# Patient Record
Sex: FEMALE | Race: WHITE | URBAN - METROPOLITAN AREA
[De-identification: names, ages, dates, MRNs, and addresses within clinical notes are randomized per-mention and may not be internally consistent; named-entity substitution may affect disease eponyms.]

---

## 2021-09-02 ENCOUNTER — OFFICE VISIT (OUTPATIENT)
Dept: URBAN - METROPOLITAN AREA CLINIC 80 | Facility: CLINIC | Age: 36
End: 2021-09-02
Payer: COMMERCIAL

## 2021-09-02 ENCOUNTER — WEB ENCOUNTER (OUTPATIENT)
Dept: URBAN - METROPOLITAN AREA CLINIC 80 | Facility: CLINIC | Age: 36
End: 2021-09-02

## 2021-09-02 ENCOUNTER — LAB OUTSIDE AN ENCOUNTER (OUTPATIENT)
Dept: URBAN - METROPOLITAN AREA CLINIC 80 | Facility: CLINIC | Age: 36
End: 2021-09-02

## 2021-09-02 VITALS
SYSTOLIC BLOOD PRESSURE: 131 MMHG | HEART RATE: 73 BPM | BODY MASS INDEX: 25.74 KG/M2 | WEIGHT: 164 LBS | DIASTOLIC BLOOD PRESSURE: 89 MMHG | HEIGHT: 67 IN | TEMPERATURE: 97.9 F

## 2021-09-02 DIAGNOSIS — L88 PYODERMA GANGRENOSA: ICD-10-CM

## 2021-09-02 DIAGNOSIS — R19.7 DIARRHEA, UNSPECIFIED TYPE: ICD-10-CM

## 2021-09-02 DIAGNOSIS — K51.311 ULCERATIVE RECTOSIGMOIDITIS WITH RECTAL BLEEDING: ICD-10-CM

## 2021-09-02 DIAGNOSIS — K62.5 RECTAL BLEEDING: ICD-10-CM

## 2021-09-02 PROBLEM — 41364008: Status: ACTIVE | Noted: 2021-09-02

## 2021-09-02 PROCEDURE — 99215 OFFICE O/P EST HI 40 MIN: CPT | Performed by: INTERNAL MEDICINE

## 2021-09-02 RX ORDER — PREDNISONE 10 MG/1
AS DIRECTED TABLET ORAL
Qty: 70 | Refills: 0 | OUTPATIENT
Start: 2021-09-02 | End: 2021-10-02

## 2021-09-02 RX ORDER — NORTRIPTYLINE HYDROCHLORIDE 10 MG/1
1 CAPSULE CAPSULE ORAL QHS
Qty: 30 | Refills: 3 | OUTPATIENT
Start: 2021-09-02

## 2021-09-02 NOTE — HPI-TODAY'S VISIT:
Patient has UC - diagnosed in 2018 after bout of e. 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

## 2021-09-03 ENCOUNTER — TELEPHONE ENCOUNTER (OUTPATIENT)
Dept: URBAN - METROPOLITAN AREA CLINIC 80 | Facility: CLINIC | Age: 36
End: 2021-09-03

## 2021-09-03 RX ORDER — ADALIMUMAB 80MG/0.8ML
80MG DAY 1, DAY 2, DAY 15 KIT SUBCUTANEOUS
Qty: 3 PRE-FILLED PEN SYRINGE | Refills: 0 | OUTPATIENT
Start: 2021-09-03 | End: 2021-09-18

## 2021-09-03 RX ORDER — PREDNISONE 10 MG/1
AS DIRECTED TABLET ORAL
Qty: 70 | Refills: 0
Start: 2021-09-02 | End: 2021-10-03

## 2021-09-03 RX ORDER — NORTRIPTYLINE HYDROCHLORIDE 10 MG/1
1 CAPSULE CAPSULE ORAL QHS
Qty: 30 | Refills: 3
Start: 2021-09-02

## 2021-09-03 RX ORDER — ADALIMUMAB 40MG/0.4ML
1 SUBQ  Q 2 WEEKS KIT SUBCUTANEOUS
Qty: 6 | Refills: 3 | OUTPATIENT
Start: 2021-09-03 | End: 2022-08-29

## 2021-09-06 LAB
A/G RATIO: 1.4
ALBUMIN: 4.1
ALKALINE PHOSPHATASE: 82
ALT (SGPT): 9
AST (SGOT): 17
BASO (ABSOLUTE): 0
BASOS: 0
BILIRUBIN, TOTAL: 0.2
BUN/CREATININE RATIO: 30
BUN: 17
C-REACTIVE PROTEIN, QUANT: 76
CALCIUM: 9.3
CARBON DIOXIDE, TOTAL: 23
CHLORIDE: 102
CREATININE: 0.57
EGFR IF AFRICN AM: 137
EGFR IF NONAFRICN AM: 119
EOS (ABSOLUTE): 0.1
EOS: 2
FERRITIN, SERUM: 86
FOLATE (FOLIC ACID), SERUM: 8.4
GLOBULIN, TOTAL: 2.9
GLUCOSE: 102
HBSAG SCREEN: NEGATIVE
HEMATOCRIT: 32.6
HEMATOLOGY COMMENTS:: (no result)
HEMOGLOBIN: 10
HEP A AB, IGM: NEGATIVE
HEP B CORE AB, IGM: NEGATIVE
HEP C VIRUS AB: 0.1
IMMATURE CELLS: (no result)
IMMATURE GRANS (ABS): 0
IMMATURE GRANULOCYTES: 0
IRON BIND.CAP.(TIBC): 247
IRON SATURATION: 9
IRON: 22
LYMPHS (ABSOLUTE): 2
LYMPHS: 28
MCH: 27.9
MCHC: 30.7
MCV: 91
MONOCYTES(ABSOLUTE): 0.5
MONOCYTES: 7
NEUTROPHILS (ABSOLUTE): 4.5
NEUTROPHILS: 63
NRBC: (no result)
PLATELETS: 335
POTASSIUM: 4.2
PROTEIN, TOTAL: 7
QUANTIFERON CRITERIA: (no result)
QUANTIFERON INCUBATION: (no result)
QUANTIFERON MITOGEN VALUE: 2.52
QUANTIFERON NIL VALUE: 0.04
QUANTIFERON TB1 AG VALUE: 0.05
QUANTIFERON TB2 AG VALUE: 0.04
QUANTIFERON-TB GOLD PLUS: NEGATIVE
RBC: 3.58
RDW: 12
SODIUM: 140
UIBC: 225
VITAMIN B12: >2000
VITAMIN D, 25-HYDROXY: 58.2
WBC: 7.1

## 2021-09-07 ENCOUNTER — LAB OUTSIDE AN ENCOUNTER (OUTPATIENT)
Dept: URBAN - METROPOLITAN AREA CLINIC 80 | Facility: CLINIC | Age: 36
End: 2021-09-07

## 2021-09-07 ENCOUNTER — TELEPHONE ENCOUNTER (OUTPATIENT)
Dept: URBAN - METROPOLITAN AREA CLINIC 92 | Facility: CLINIC | Age: 36
End: 2021-09-07

## 2021-09-07 RX ORDER — FERRIC CARBOXYMALTOSE INJECTION 50 MG/ML
AS DIRECTED INJECTION, SOLUTION INTRAVENOUS
Qty: 1 | Refills: 0 | OUTPATIENT
Start: 2021-09-07

## 2021-09-14 ENCOUNTER — LAB OUTSIDE AN ENCOUNTER (OUTPATIENT)
Dept: URBAN - METROPOLITAN AREA CLINIC 92 | Facility: CLINIC | Age: 36
End: 2021-09-14

## 2021-09-15 LAB
CALPROTECTIN, STOOL - QDX: (no result)
GASTROINTESTINAL PATHOGEN: (no result)

## 2021-11-08 ENCOUNTER — TELEPHONE ENCOUNTER (OUTPATIENT)
Dept: URBAN - METROPOLITAN AREA CLINIC 79 | Facility: CLINIC | Age: 36
End: 2021-11-08

## 2021-12-01 ENCOUNTER — OFFICE VISIT (OUTPATIENT)
Dept: URBAN - METROPOLITAN AREA CLINIC 80 | Facility: CLINIC | Age: 36
End: 2021-12-01

## 2021-12-28 ENCOUNTER — TELEPHONE ENCOUNTER (OUTPATIENT)
Dept: URBAN - METROPOLITAN AREA CLINIC 79 | Facility: CLINIC | Age: 36
End: 2021-12-28

## 2022-02-02 ENCOUNTER — OFFICE VISIT (OUTPATIENT)
Dept: URBAN - METROPOLITAN AREA CLINIC 80 | Facility: CLINIC | Age: 37
End: 2022-02-02
Payer: COMMERCIAL

## 2022-02-02 ENCOUNTER — LAB OUTSIDE AN ENCOUNTER (OUTPATIENT)
Dept: URBAN - METROPOLITAN AREA CLINIC 80 | Facility: CLINIC | Age: 37
End: 2022-02-02

## 2022-02-02 DIAGNOSIS — K51.00 ULCERATIVE PANCOLITIS WITHOUT COMPLICATION: ICD-10-CM

## 2022-02-02 DIAGNOSIS — L88 PYODERMA GANGRENOSUM: ICD-10-CM

## 2022-02-02 DIAGNOSIS — D50.9 IRON DEFICIENCY ANEMIA, UNSPECIFIED IRON DEFICIENCY ANEMIA TYPE: ICD-10-CM

## 2022-02-02 PROCEDURE — 99213 OFFICE O/P EST LOW 20 MIN: CPT | Performed by: PHYSICIAN ASSISTANT

## 2022-02-02 RX ORDER — POLYETHYLENE GLYCOL 3350, SODIUM SULFATE, SODIUM CHLORIDE, POTASSIUM CHLORIDE, ASCORBIC ACID, SODIUM ASCORBATE 140-9-5.2G
AS DIRECTED KIT ORAL 1
Qty: 1 | Refills: 0 | OUTPATIENT
Start: 2022-02-02 | End: 2022-02-03

## 2022-02-02 RX ORDER — FERRIC CARBOXYMALTOSE INJECTION 50 MG/ML
AS DIRECTED INJECTION, SOLUTION INTRAVENOUS
Qty: 1 | Refills: 0 | Status: DISCONTINUED | COMMUNITY
Start: 2021-09-07

## 2022-02-02 RX ORDER — NORTRIPTYLINE HYDROCHLORIDE 10 MG/1
1 CAPSULE CAPSULE ORAL QHS
Qty: 30 | Refills: 3 | Status: DISCONTINUED | COMMUNITY
Start: 2021-09-02

## 2022-02-02 RX ORDER — ADALIMUMAB 40MG/0.4ML
1 SUBQ  Q 2 WEEKS KIT SUBCUTANEOUS
Qty: 6 | Refills: 3 | Status: ACTIVE | COMMUNITY
Start: 2021-09-03 | End: 2022-08-29

## 2022-02-02 NOTE — HPI-TODAY'S VISIT:
Pt just got their baby little girl they adopted - she is 3 months old She has weaned off the Nortriptyline since she was too worried about being groggy in the middle of the night with the baby She felt great on it but felt weird about having to be on it and groggy with the baby  She is feeling good Taking Humira every other week Her skin is better BM are normal - 2 BM a day No BRBPR or melena Last colon 5/2019

## 2022-02-02 NOTE — PHYSICAL EXAM EYES:
Conjuntivae and eyelids appear normal, Sclerae : White without injection Quality 226: Preventive Care And Screening: Tobacco Use: Screening And Cessation Intervention: Patient screened for tobacco and never smoked Quality 431: Preventive Care And Screening: Unhealthy Alcohol Use - Screening: Patient screened for unhealthy alcohol use using a single question and scores less than 2 times per year Detail Level: Detailed Quality 110: Preventive Care And Screening: Influenza Immunization: Influenza immunization was not ordered or administered, reason not given Quality 154 Part A: Falls: Risk Assessment (Should Be Reported With Measure 155.): Falls risk assessment completed and documented in the past 12 months. Quality 154 Part B: Falls: Risk Screening (Should Be Reported With Measure 155.): Patient screened for future fall risk; documentation of no falls in the past year or only one fall without injury in the past year

## 2022-02-03 ENCOUNTER — WEB ENCOUNTER (OUTPATIENT)
Dept: URBAN - METROPOLITAN AREA CLINIC 80 | Facility: CLINIC | Age: 37
End: 2022-02-03

## 2022-02-03 LAB
A/G RATIO: 2
ALBUMIN: 4.7
ALKALINE PHOSPHATASE: 55
ALT (SGPT): 24
AST (SGOT): 18
BASO (ABSOLUTE): 0
BASOS: 0
BILIRUBIN, TOTAL: 0.7
BUN/CREATININE RATIO: 21
BUN: 14
CALCIUM: 9.6
CARBON DIOXIDE, TOTAL: 23
CHLORIDE: 105
CREATININE: 0.67
EGFR IF AFRICN AM: 129
EGFR IF NONAFRICN AM: 112
EOS (ABSOLUTE): 0
EOS: 1
FERRITIN, SERUM: 328
FOLATE (FOLIC ACID), SERUM: 13.6
GLOBULIN, TOTAL: 2.4
GLUCOSE: 92
HEMATOCRIT: 33.8
HEMATOLOGY COMMENTS:: (no result)
HEMOGLOBIN: 11.3
IMMATURE CELLS: (no result)
IMMATURE GRANS (ABS): 0
IMMATURE GRANULOCYTES: 0
IRON BIND.CAP.(TIBC): 241
IRON SATURATION: 69
IRON: 167
LYMPHS (ABSOLUTE): 2.6
LYMPHS: 43
MCH: 31.7
MCHC: 33.4
MCV: 95
MONOCYTES(ABSOLUTE): 0.4
MONOCYTES: 7
NEUTROPHILS (ABSOLUTE): 2.9
NEUTROPHILS: 49
NRBC: (no result)
PLATELETS: 203
POTASSIUM: 4.3
PROTEIN, TOTAL: 7.1
RBC: 3.56
RDW: 11.6
SODIUM: 139
UIBC: 74
VITAMIN B12: 1381
VITAMIN D, 25-HYDROXY: 48
WBC: 6

## 2022-03-04 ENCOUNTER — CLAIMS CREATED FROM THE CLAIM WINDOW (OUTPATIENT)
Dept: URBAN - METROPOLITAN AREA CLINIC 4 | Facility: CLINIC | Age: 37
End: 2022-03-04
Payer: COMMERCIAL

## 2022-03-04 ENCOUNTER — TELEPHONE ENCOUNTER (OUTPATIENT)
Dept: URBAN - METROPOLITAN AREA CLINIC 79 | Facility: CLINIC | Age: 37
End: 2022-03-04

## 2022-03-04 ENCOUNTER — OFFICE VISIT (OUTPATIENT)
Dept: URBAN - METROPOLITAN AREA SURGERY CENTER 19 | Facility: SURGERY CENTER | Age: 37
End: 2022-03-04
Payer: COMMERCIAL

## 2022-03-04 DIAGNOSIS — K51.00 ACUTE ULCERATIVE PANCOLITIS: ICD-10-CM

## 2022-03-04 DIAGNOSIS — K51.80 OTHER ULCERATIVE COLITIS WITHOUT COMPLICATIONS: ICD-10-CM

## 2022-03-04 DIAGNOSIS — K56.699 COLON STRICTURE: ICD-10-CM

## 2022-03-04 PROCEDURE — 88305 TISSUE EXAM BY PATHOLOGIST: CPT | Performed by: PATHOLOGY

## 2022-03-04 PROCEDURE — G8907 PT DOC NO EVENTS ON DISCHARG: HCPCS | Performed by: INTERNAL MEDICINE

## 2022-03-04 PROCEDURE — 45331 SIGMOIDOSCOPY AND BIOPSY: CPT | Performed by: INTERNAL MEDICINE

## 2022-03-04 RX ORDER — ADALIMUMAB 40MG/0.4ML
1 SUBQ  Q 2 WEEKS KIT SUBCUTANEOUS
Qty: 6 | Refills: 3 | Status: ACTIVE | COMMUNITY
Start: 2021-09-03 | End: 2022-08-29

## 2022-03-17 ENCOUNTER — WEB ENCOUNTER (OUTPATIENT)
Dept: URBAN - METROPOLITAN AREA CLINIC 80 | Facility: CLINIC | Age: 37
End: 2022-03-17

## 2022-03-21 ENCOUNTER — TELEPHONE ENCOUNTER (OUTPATIENT)
Dept: URBAN - METROPOLITAN AREA CLINIC 92 | Facility: CLINIC | Age: 37
End: 2022-03-21

## 2022-03-21 RX ORDER — BALSALAZIDE DISODIUM 750 MG/1
6 CAPSULES CAPSULE ORAL QD
Qty: 540 CAPSULE | Refills: 3 | OUTPATIENT
Start: 2022-03-21 | End: 2023-03-16

## 2022-03-21 RX ORDER — MESALAMINE 1000 MG/1
1 SUPPOSITORY AT BEDTIME SUPPOSITORY RECTAL ONCE A DAY
Qty: 30 | Refills: 6 | OUTPATIENT
Start: 2022-03-21 | End: 2022-10-17

## 2022-08-17 ENCOUNTER — OFFICE VISIT (OUTPATIENT)
Dept: URBAN - METROPOLITAN AREA CLINIC 80 | Facility: CLINIC | Age: 37
End: 2022-08-17
Payer: COMMERCIAL

## 2022-08-17 ENCOUNTER — LAB OUTSIDE AN ENCOUNTER (OUTPATIENT)
Dept: URBAN - METROPOLITAN AREA CLINIC 80 | Facility: CLINIC | Age: 37
End: 2022-08-17

## 2022-08-17 VITALS
SYSTOLIC BLOOD PRESSURE: 114 MMHG | BODY MASS INDEX: 25.52 KG/M2 | TEMPERATURE: 97.4 F | WEIGHT: 162.6 LBS | DIASTOLIC BLOOD PRESSURE: 72 MMHG | HEIGHT: 67 IN | HEART RATE: 60 BPM

## 2022-08-17 DIAGNOSIS — D50.9 IRON DEFICIENCY ANEMIA, UNSPECIFIED IRON DEFICIENCY ANEMIA TYPE: ICD-10-CM

## 2022-08-17 DIAGNOSIS — K51.00 ULCERATIVE PANCOLITIS WITHOUT COMPLICATION: ICD-10-CM

## 2022-08-17 DIAGNOSIS — L88 PYODERMA GANGRENOSUM: ICD-10-CM

## 2022-08-17 PROBLEM — 87522002: Status: ACTIVE | Noted: 2021-09-07

## 2022-08-17 PROCEDURE — 99213 OFFICE O/P EST LOW 20 MIN: CPT | Performed by: PHYSICIAN ASSISTANT

## 2022-08-17 RX ORDER — ADALIMUMAB 40MG/0.4ML
1 SUBQ  Q 2 WEEKS KIT SUBCUTANEOUS
Qty: 6 | Refills: 3 | Status: ACTIVE | COMMUNITY
Start: 2021-09-03 | End: 2022-08-29

## 2022-08-17 RX ORDER — BALSALAZIDE DISODIUM 750 MG/1
6 CAPSULES CAPSULE ORAL QD
Qty: 540 CAPSULE | Refills: 3 | Status: DISCONTINUED | COMMUNITY
Start: 2022-03-21 | End: 2023-03-16

## 2022-08-17 RX ORDER — BALSALAZIDE DISODIUM 750 MG/1
6 CAPSULES CAPSULE ORAL ONCE A DAY
Qty: 540 CAPSULE | Refills: 3 | OUTPATIENT
Start: 2022-08-17 | End: 2023-08-12

## 2022-08-17 RX ORDER — SODIUM, POTASSIUM,MAG SULFATES 17.5-3.13G
354 ML SOLUTION, RECONSTITUTED, ORAL ORAL AS DIRECTED
Qty: 1 | Refills: 0 | OUTPATIENT
Start: 2022-08-17 | End: 2022-08-18

## 2022-08-17 RX ORDER — MESALAMINE 1000 MG/1
1 SUPPOSITORY AT BEDTIME SUPPOSITORY RECTAL ONCE A DAY
Qty: 30 | Refills: 6 | Status: DISCONTINUED | COMMUNITY
Start: 2022-03-21 | End: 2022-10-17

## 2022-08-17 NOTE — HPI-TODAY'S VISIT:
She feels great She is on Humira every other week On 6 pills of Colazal a day was on the suppositories as well - off them now She is having 2-3 BM a day No BRBPR or melena no skin lesions No joint pain She feels better than she has felt in years

## 2022-08-18 LAB
A/G RATIO: 1.8
ABSOLUTE BASOPHILS: 20
ABSOLUTE EOSINOPHILS: 107
ABSOLUTE LYMPHOCYTES: 2975
ABSOLUTE MONOCYTES: 462
ABSOLUTE NEUTROPHILS: 3136
ALBUMIN: 4.6
ALKALINE PHOSPHATASE: 44
ALT (SGPT): 27
AST (SGOT): 31
BASOPHILS: 0.3
BILIRUBIN, TOTAL: 0.5
BUN/CREATININE RATIO: (no result)
BUN: 18
C-REACTIVE PROTEIN, QUANT: 1.1
CALCIUM: 9.5
CARBON DIOXIDE, TOTAL: 27
CHLORIDE: 102
CREATININE: 0.71
EGFR: 112
EOSINOPHILS: 1.6
GLOBULIN, TOTAL: 2.6
GLUCOSE: 85
HEMATOCRIT: 36.7
HEMOGLOBIN: 11.9
LYMPHOCYTES: 44.4
MCH: 31.2
MCHC: 32.4
MCV: 96.1
MONOCYTES: 6.9
MPV: 11.1
NEUTROPHILS: 46.8
PLATELET COUNT: 198
POTASSIUM: 3.9
PROTEIN, TOTAL: 7.2
RDW: 11.8
RED BLOOD CELL COUNT: 3.82
SODIUM: 138
WHITE BLOOD CELL COUNT: 6.7

## 2022-08-25 PROBLEM — 444548001: Status: ACTIVE | Noted: 2022-02-02

## 2022-09-12 ENCOUNTER — TELEPHONE ENCOUNTER (OUTPATIENT)
Dept: URBAN - METROPOLITAN AREA CLINIC 79 | Facility: CLINIC | Age: 37
End: 2022-09-12

## 2022-09-12 RX ORDER — ADALIMUMAB 40MG/0.4ML
1 SUBQ  Q 2 WEEKS KIT SUBCUTANEOUS
Qty: 6 | Refills: 3 | OUTPATIENT
Start: 2022-09-13 | End: 2023-09-08

## 2022-11-04 ENCOUNTER — OFFICE VISIT (OUTPATIENT)
Dept: URBAN - METROPOLITAN AREA MEDICAL CENTER 28 | Facility: MEDICAL CENTER | Age: 37
End: 2022-11-04
Payer: COMMERCIAL

## 2022-11-04 ENCOUNTER — LAB OUTSIDE AN ENCOUNTER (OUTPATIENT)
Dept: URBAN - METROPOLITAN AREA CLINIC 92 | Facility: CLINIC | Age: 37
End: 2022-11-04

## 2022-11-04 ENCOUNTER — TELEPHONE ENCOUNTER (OUTPATIENT)
Dept: URBAN - METROPOLITAN AREA CLINIC 92 | Facility: CLINIC | Age: 37
End: 2022-11-04

## 2022-11-04 DIAGNOSIS — K56.699 COLON STRICTURE: ICD-10-CM

## 2022-11-04 DIAGNOSIS — K62.89 ACUTE PROCTITIS: ICD-10-CM

## 2022-11-04 DIAGNOSIS — K63.89 BACTERIAL OVERGROWTH SYNDROME: ICD-10-CM

## 2022-11-04 DIAGNOSIS — K51.00 ACUTE ULCERATIVE PANCOLITIS: ICD-10-CM

## 2022-11-04 PROCEDURE — 45386 COLONOSCOPY W/BALLOON DILAT: CPT | Performed by: INTERNAL MEDICINE

## 2022-11-04 RX ORDER — ADALIMUMAB 40MG/0.4ML
1 SUBQ  Q 2 WEEKS KIT SUBCUTANEOUS
Qty: 6 | Refills: 3 | Status: ACTIVE | COMMUNITY
Start: 2022-09-13 | End: 2023-09-08

## 2022-11-04 RX ORDER — BALSALAZIDE DISODIUM 750 MG/1
6 CAPSULES CAPSULE ORAL ONCE A DAY
Qty: 540 CAPSULE | Refills: 3 | Status: ACTIVE | COMMUNITY
Start: 2022-08-17 | End: 2023-08-12

## 2022-11-04 NOTE — HPI-TODAY'S VISIT:
11/4/2022 - A moderate stenosis measuring 3 cm (in length) x 1 cm (inner diameter) was found in the sigmoid colon and was non- traversed.  A TTS dilator was passed through the scope.  Dilation with a 6-7-8 mm, an 8-9-10 mm and a 10-11-12 mm  colonic balloon dilator was performed.  The dilation site was examined following endoscope reinsertion and showed  moderate mucosal disruption.  Findings: - Diffuse inflammation was found in the rectum and in the recto-sigmoid colon.

## 2022-11-06 ENCOUNTER — WEB ENCOUNTER (OUTPATIENT)
Dept: URBAN - METROPOLITAN AREA CLINIC 80 | Facility: CLINIC | Age: 37
End: 2022-11-06

## 2022-11-07 ENCOUNTER — WEB ENCOUNTER (OUTPATIENT)
Dept: URBAN - METROPOLITAN AREA CLINIC 80 | Facility: CLINIC | Age: 37
End: 2022-11-07

## 2022-11-07 RX ORDER — METHYLPREDNISOLONE 4 MG/1
AS DIRECTED TABLET ORAL AS DIRECTED
Qty: 1 | Refills: 0 | OUTPATIENT
Start: 2022-11-09 | End: 2022-11-16

## 2022-11-17 ENCOUNTER — TELEPHONE ENCOUNTER (OUTPATIENT)
Dept: URBAN - METROPOLITAN AREA CLINIC 3 | Facility: CLINIC | Age: 37
End: 2022-11-17

## 2022-11-17 RX ORDER — ADALIMUMAB 40MG/0.4ML
1 SUBQ  Q 2 WEEKS KIT SUBCUTANEOUS
Qty: 6 | Refills: 3 | Status: ACTIVE | COMMUNITY
Start: 2022-09-13 | End: 2023-09-08

## 2022-11-17 RX ORDER — POLYETHYLENE GLYCOL 3350, SODIUM SULFATE, SODIUM CHLORIDE, POTASSIUM CHLORIDE, ASCORBIC ACID, SODIUM ASCORBATE 140-9-5.2G
1 KIT KIT ORAL ONCE
Qty: 1 | Refills: 1 | OUTPATIENT
Start: 2022-11-18 | End: 2022-11-20

## 2022-11-17 RX ORDER — BALSALAZIDE DISODIUM 750 MG/1
6 CAPSULES CAPSULE ORAL ONCE A DAY
Qty: 540 CAPSULE | Refills: 3 | Status: ACTIVE | COMMUNITY
Start: 2022-08-17 | End: 2023-08-12

## 2022-11-28 ENCOUNTER — ERX REFILL RESPONSE (OUTPATIENT)
Dept: URBAN - METROPOLITAN AREA CLINIC 80 | Facility: CLINIC | Age: 37
End: 2022-11-28

## 2022-11-28 RX ORDER — ADALIMUMAB 40MG/0.4ML
1 SUBQ  Q 2 WEEKS KIT SUBCUTANEOUS
Qty: 6 | Refills: 3 | OUTPATIENT

## 2022-11-28 RX ORDER — ADALIMUMAB 40MG/0.4ML
INJECT 1 PEN SUBCUTANEOUSLY EVERY TWO WEEKS KIT SUBCUTANEOUS
Qty: 6 MILLILITER | Refills: 11 | OUTPATIENT

## 2022-12-05 ENCOUNTER — OFFICE VISIT (OUTPATIENT)
Dept: URBAN - METROPOLITAN AREA TELEHEALTH 2 | Facility: TELEHEALTH | Age: 37
End: 2022-12-05
Payer: COMMERCIAL

## 2022-12-05 ENCOUNTER — TELEPHONE ENCOUNTER (OUTPATIENT)
Dept: URBAN - METROPOLITAN AREA CLINIC 92 | Facility: CLINIC | Age: 37
End: 2022-12-05

## 2022-12-05 ENCOUNTER — TELEPHONE ENCOUNTER (OUTPATIENT)
Dept: URBAN - METROPOLITAN AREA TELEHEALTH 2 | Facility: TELEHEALTH | Age: 37
End: 2022-12-05

## 2022-12-05 DIAGNOSIS — Z79.899 LONG-TERM USE OF IMMUNOSUPPRESSANT MEDICATION: ICD-10-CM

## 2022-12-05 DIAGNOSIS — K92.1 HEMATOCHEZIA: ICD-10-CM

## 2022-12-05 DIAGNOSIS — K51.80 CHRONIC PANCOLONIC ULCERATIVE COLITIS: ICD-10-CM

## 2022-12-05 PROCEDURE — 99215 OFFICE O/P EST HI 40 MIN: CPT | Performed by: INTERNAL MEDICINE

## 2022-12-05 RX ORDER — ADALIMUMAB 40MG/0.4ML
1 PEN KIT SUBCUTANEOUS
Qty: 1 | Refills: 11 | OUTPATIENT
Start: 2022-12-05 | End: 2022-12-17

## 2022-12-05 RX ORDER — BALSALAZIDE DISODIUM 750 MG/1
6 CAPSULES CAPSULE ORAL ONCE A DAY
Qty: 540 CAPSULE | Refills: 3 | Status: ACTIVE | COMMUNITY
Start: 2022-08-17 | End: 2023-08-12

## 2022-12-05 RX ORDER — BALSALAZIDE DISODIUM 750 MG/1
6 CAPSULES CAPSULE ORAL ONCE DAILY
Qty: 180 CAPSULE | Refills: 11 | OUTPATIENT
Start: 2022-12-05 | End: 2023-11-30

## 2022-12-05 RX ORDER — ADALIMUMAB 40MG/0.4ML
INJECT 1 PEN SUBCUTANEOUSLY EVERY TWO WEEKS KIT SUBCUTANEOUS
Qty: 6 MILLILITER | Refills: 11 | Status: ACTIVE | COMMUNITY

## 2022-12-05 RX ORDER — SULFASALAZINE 500 MG/1
2 TABLET TABLET ORAL ONCE A DAY
Qty: 60 TABLET | Refills: 11 | OUTPATIENT
Start: 2022-12-05 | End: 2023-11-30

## 2022-12-08 ENCOUNTER — WEB ENCOUNTER (OUTPATIENT)
Dept: URBAN - METROPOLITAN AREA CLINIC 98 | Facility: CLINIC | Age: 37
End: 2022-12-08

## 2022-12-09 ENCOUNTER — WEB ENCOUNTER (OUTPATIENT)
Dept: URBAN - METROPOLITAN AREA CLINIC 98 | Facility: CLINIC | Age: 37
End: 2022-12-09

## 2022-12-09 RX ORDER — SULFASALAZINE 500 MG/1
6 TABLET TABLET ORAL ONCE A DAY
Qty: 180 TABLET | Refills: 11
Start: 2022-12-05 | End: 2023-12-07

## 2022-12-16 LAB
A/G RATIO: 1.5
ADALIMUMAB DRUG LEVEL: 7.1
ALBUMIN: 4.3
ALKALINE PHOSPHATASE: 52
ALT (SGPT): 14
ANTI-ADALIMUMAB ANTIBODY: <25
AST (SGOT): 20
BASO (ABSOLUTE): 0
BASOS: 0
BILIRUBIN, TOTAL: 0.3
BUN/CREATININE RATIO: 26
BUN: 19
CALCIUM: 9.7
CARBON DIOXIDE, TOTAL: 22
CHLORIDE: 102
CREATININE: 0.72
EGFR: 109
EOS (ABSOLUTE): 0.2
EOS: 2
GLOBULIN, TOTAL: 2.9
GLUCOSE: 89
HEMATOCRIT: 36.9
HEMATOLOGY COMMENTS:: (no result)
HEMOGLOBIN: 11.8
IMMATURE CELLS: (no result)
IMMATURE GRANS (ABS): 0
IMMATURE GRANULOCYTES: 0
LYMPHS (ABSOLUTE): 2.9
LYMPHS: 33
Lab: (no result)
MCH: 30.6
MCHC: 32
MCV: 96
MONOCYTES(ABSOLUTE): 0.6
MONOCYTES: 7
NEUTROPHILS (ABSOLUTE): 5.2
NEUTROPHILS: 58
NRBC: (no result)
PLATELETS: 221
POTASSIUM: 4.3
PROTEIN, TOTAL: 7.2
QUANTIFERON CRITERIA: (no result)
QUANTIFERON INCUBATION: (no result)
QUANTIFERON MITOGEN VALUE: >10
QUANTIFERON NIL VALUE: 0.23
QUANTIFERON TB1 AG VALUE: 0.28
QUANTIFERON TB2 AG VALUE: 0.33
QUANTIFERON-TB GOLD PLUS: NEGATIVE
RBC: 3.85
RDW: 11.9
SODIUM: 139
WBC: 8.9

## 2023-01-29 ENCOUNTER — WEB ENCOUNTER (OUTPATIENT)
Dept: URBAN - METROPOLITAN AREA CLINIC 96 | Facility: CLINIC | Age: 38
End: 2023-01-29

## 2023-01-29 RX ORDER — ADALIMUMAB 40MG/0.4ML
1 PEN KIT SUBCUTANEOUS
Qty: 2 | Refills: 11

## 2023-01-31 ENCOUNTER — WEB ENCOUNTER (OUTPATIENT)
Dept: URBAN - METROPOLITAN AREA CLINIC 98 | Facility: CLINIC | Age: 38
End: 2023-01-31

## 2023-01-31 PROBLEM — 64766004 ULCERATIVE COLITIS: Status: ACTIVE | Noted: 2022-12-05

## 2023-01-31 RX ORDER — ADALIMUMAB 40MG/0.4ML
1 PEN KIT SUBCUTANEOUS
Qty: 2 | Refills: 11
End: 2024-01-10

## 2023-02-08 ENCOUNTER — TELEPHONE ENCOUNTER (OUTPATIENT)
Dept: URBAN - METROPOLITAN AREA CLINIC 92 | Facility: CLINIC | Age: 38
End: 2023-02-08

## 2023-02-08 RX ORDER — ADALIMUMAB 40MG/0.4ML
1 PEN KIT SUBCUTANEOUS
Qty: 2 | Refills: 11
End: 2024-01-10

## 2023-02-17 ENCOUNTER — OFFICE VISIT (OUTPATIENT)
Dept: URBAN - METROPOLITAN AREA MEDICAL CENTER 28 | Facility: MEDICAL CENTER | Age: 38
End: 2023-02-17
Payer: COMMERCIAL

## 2023-02-17 ENCOUNTER — WEB ENCOUNTER (OUTPATIENT)
Dept: URBAN - METROPOLITAN AREA CLINIC 96 | Facility: CLINIC | Age: 38
End: 2023-02-17

## 2023-02-17 DIAGNOSIS — K51.00 ACUTE ULCERATIVE PANCOLITIS: ICD-10-CM

## 2023-02-17 DIAGNOSIS — K56.699 COLON STRICTURE: ICD-10-CM

## 2023-02-17 PROCEDURE — 45340 SIG W/TNDSC BALLOON DILATION: CPT | Performed by: INTERNAL MEDICINE

## 2023-02-17 RX ORDER — ADALIMUMAB 40MG/0.4ML
1 PEN KIT SUBCUTANEOUS
Qty: 2 | Refills: 11 | Status: ACTIVE | COMMUNITY
End: 2024-01-10

## 2023-02-17 RX ORDER — BALSALAZIDE DISODIUM 750 MG/1
6 CAPSULES CAPSULE ORAL ONCE A DAY
Qty: 540 CAPSULE | Refills: 3 | Status: ACTIVE | COMMUNITY
Start: 2022-08-17 | End: 2023-08-12

## 2023-02-17 RX ORDER — BALSALAZIDE DISODIUM 750 MG/1
6 CAPSULES CAPSULE ORAL ONCE DAILY
Qty: 180 CAPSULE | Refills: 11 | Status: ACTIVE | COMMUNITY
Start: 2022-12-05 | End: 2023-11-30

## 2023-02-17 RX ORDER — SULFASALAZINE 500 MG/1
6 TABLET TABLET ORAL ONCE A DAY
Qty: 180 TABLET | Refills: 11 | Status: ACTIVE | COMMUNITY
Start: 2022-12-05 | End: 2023-12-07

## 2023-02-17 NOTE — HPI-TODAY'S VISIT:
2/17/2023: - A severe stenosis measuring 2 cm (in length) x 1 cm (inner diameter) was found in the sigmoid colon and was non- traversed.  A TTS dilator was passed through the scope.  Dilation with a 10-11-12, with max size 11mm colonic balloon  dilator was performed.  The dilation site was examined following endoscope reinsertion and showed moderate mucosal  disruption and moderate improvement in luminal narrowing.

## 2023-02-23 ENCOUNTER — WEB ENCOUNTER (OUTPATIENT)
Dept: URBAN - METROPOLITAN AREA CLINIC 98 | Facility: CLINIC | Age: 38
End: 2023-02-23

## 2023-03-20 ENCOUNTER — OFFICE VISIT (OUTPATIENT)
Dept: URBAN - METROPOLITAN AREA TELEHEALTH 2 | Facility: TELEHEALTH | Age: 38
End: 2023-03-20

## 2023-04-07 ENCOUNTER — OFFICE VISIT (OUTPATIENT)
Dept: URBAN - METROPOLITAN AREA TELEHEALTH 2 | Facility: TELEHEALTH | Age: 38
End: 2023-04-07
Payer: COMMERCIAL

## 2023-04-07 ENCOUNTER — TELEPHONE ENCOUNTER (OUTPATIENT)
Dept: URBAN - METROPOLITAN AREA CLINIC 35 | Facility: CLINIC | Age: 38
End: 2023-04-07

## 2023-04-07 DIAGNOSIS — K51.90 UC (ULCERATIVE COLITIS): ICD-10-CM

## 2023-04-07 DIAGNOSIS — Z79.899 LONG-TERM USE OF IMMUNOSUPPRESSANT MEDICATION: ICD-10-CM

## 2023-04-07 PROCEDURE — 99215 OFFICE O/P EST HI 40 MIN: CPT | Performed by: INTERNAL MEDICINE

## 2023-04-07 RX ORDER — BALSALAZIDE DISODIUM 750 MG/1
6 CAPSULES CAPSULE ORAL ONCE DAILY
Qty: 180 CAPSULE | Refills: 11 | Status: ACTIVE | COMMUNITY
Start: 2022-12-05 | End: 2023-11-30

## 2023-04-07 RX ORDER — USTEKINUMAB 90 MG/ML
1 INJECTION INJECTION, SOLUTION SUBCUTANEOUS ONCE
Qty: 1 | Refills: 11 | OUTPATIENT
Start: 2023-04-07 | End: 2025-02-06

## 2023-04-07 RX ORDER — SULFASALAZINE 500 MG/1
2 TABLET TABLET ORAL ONCE A DAY
Qty: 60 TABLET | Refills: 11 | OUTPATIENT

## 2023-04-07 RX ORDER — ADALIMUMAB 40MG/0.4ML
1 PEN KIT SUBCUTANEOUS
Qty: 2 | Refills: 11 | Status: ACTIVE | COMMUNITY
End: 2024-01-10

## 2023-04-07 RX ORDER — SULFASALAZINE 500 MG/1
6 TABLET TABLET ORAL ONCE A DAY
Qty: 180 TABLET | Refills: 11 | Status: ACTIVE | COMMUNITY
Start: 2022-12-05 | End: 2023-12-07

## 2023-04-07 RX ORDER — BALSALAZIDE DISODIUM 750 MG/1
6 CAPSULES CAPSULE ORAL ONCE DAILY
Qty: 180 CAPSULE | Refills: 11 | OUTPATIENT

## 2023-04-07 RX ORDER — USTEKINUMAB 90 MG/ML
1 INJECTION INJECTION, SOLUTION SUBCUTANEOUS ONCE
Qty: 1 | Refills: 11
Start: 2023-04-07 | End: 2023-07-21

## 2023-04-07 RX ORDER — USTEKINUMAB 130 MG/26ML
AS DIRECTED SOLUTION INTRAVENOUS ONCE
Qty: 390 MILLIGRAMS | Refills: 0 | OUTPATIENT
Start: 2023-04-07 | End: 2023-04-08

## 2023-04-07 RX ORDER — BALSALAZIDE DISODIUM 750 MG/1
6 CAPSULES CAPSULE ORAL ONCE A DAY
Qty: 540 CAPSULE | Refills: 3 | Status: ACTIVE | COMMUNITY
Start: 2022-08-17 | End: 2023-08-12

## 2023-04-07 NOTE — HPI-TODAY'S VISIT:
Magdiel Bonilla is a 38 y/o indiv with colonic UC, currently on Humira and Balsalazide (6 tab), here for eval of her condition. . Pt is referred by Marc Romo. . She was dxd with UC in Summer 2018.  She did very well until she started on sulfasalzine, which worked very well for her; she had a lot of gastric/vaginal discomfort.  This has been complicated by EN and pyoderma gangrenosum.  She started on Humira on 9/2021, which has resolved her PG and EN.   . Previously on 12/5/2022, pt reports that she did well after colonoscopy with dilation on 11/2022.  Had taken clindamycin prior to the scope.  Overall, doing very well. She has adopted a child.  She has seen a scant amount of bleeding. . Today on 4/7/2023, pt reports that she is asymptomatic.  No diarrhea or rectal bleeding. . 11/4/2022 - A moderate stenosis measuring 3 cm (in length) x 1 cm (inner diameter) was found in the sigmoid colon and was non- traversed.  A TTS dilator was passed through the scope.  Dilation with a 6-7-8 mm, an 8-9-10 mm and a 10-11-12 mm  colonic balloon dilator was performed.  The dilation site was examined following endoscope reinsertion and showed  moderate mucosal disruption.  Findings: - Diffuse inflammation was found in the rectum and in the recto-sigmoid colon. . 3/2022 (Dr. Hare): Colon, Recto-Sigmoid, Biopsy: PATCHY CHRONIC ACTIVE COLITIS, CONSISTENT WITH PARTIALLY TREATED ULCERATIVE COLITIS. Negative for Infectious Organisms, Dysplasia or Malignancy. . 2/17/2023: Stenosis traversed.  A TTS dilator was passed through the scope.  Dilation with a 10-11-12, with max size 11mm colonic balloon dilator was performed.  The dilation site was examined following endoscope reinsertion and showed moderate mucosal  disruption and moderate improvement in luminal narrowing

## 2023-04-19 ENCOUNTER — WEB ENCOUNTER (OUTPATIENT)
Dept: URBAN - METROPOLITAN AREA CLINIC 96 | Facility: CLINIC | Age: 38
End: 2023-04-19

## 2023-04-19 RX ORDER — USTEKINUMAB 90 MG/ML
1 INJECTION INJECTION, SOLUTION SUBCUTANEOUS ONCE
Qty: 1 | Refills: 11
Start: 2023-04-07 | End: 2025-02-19

## 2023-04-24 ENCOUNTER — TELEPHONE ENCOUNTER (OUTPATIENT)
Dept: URBAN - METROPOLITAN AREA CLINIC 18 | Facility: CLINIC | Age: 38
End: 2023-04-24

## 2023-04-24 ENCOUNTER — OFFICE VISIT (OUTPATIENT)
Dept: URBAN - METROPOLITAN AREA CLINIC 18 | Facility: CLINIC | Age: 38
End: 2023-04-24
Payer: COMMERCIAL

## 2023-04-24 ENCOUNTER — WEB ENCOUNTER (OUTPATIENT)
Dept: URBAN - METROPOLITAN AREA CLINIC 98 | Facility: CLINIC | Age: 38
End: 2023-04-24

## 2023-04-24 VITALS
WEIGHT: 166 LBS | HEIGHT: 67 IN | BODY MASS INDEX: 26.06 KG/M2 | SYSTOLIC BLOOD PRESSURE: 115 MMHG | RESPIRATION RATE: 16 BRPM | HEART RATE: 66 BPM | DIASTOLIC BLOOD PRESSURE: 71 MMHG | TEMPERATURE: 98.2 F

## 2023-04-24 DIAGNOSIS — K51.80 CHRONIC PANCOLONIC ULCERATIVE COLITIS: ICD-10-CM

## 2023-04-24 PROCEDURE — 96413 CHEMO IV INFUSION 1 HR: CPT | Performed by: INTERNAL MEDICINE

## 2023-04-24 RX ORDER — SULFASALAZINE 500 MG/1
2 TABLET TABLET ORAL ONCE A DAY
Qty: 60 TABLET | Refills: 11 | Status: ACTIVE | COMMUNITY

## 2023-04-24 RX ORDER — USTEKINUMAB 90 MG/ML
1 INJECTION INJECTION, SOLUTION SUBCUTANEOUS ONCE
Qty: 1 | Refills: 11 | Status: ACTIVE | COMMUNITY
Start: 2023-04-07 | End: 2025-02-19

## 2023-04-24 RX ORDER — ADALIMUMAB 40MG/0.4ML
1 PEN KIT SUBCUTANEOUS
Qty: 2 | Refills: 11 | Status: ACTIVE | COMMUNITY
End: 2024-01-10

## 2023-04-24 RX ORDER — BALSALAZIDE DISODIUM 750 MG/1
6 CAPSULES CAPSULE ORAL ONCE A DAY
Qty: 540 CAPSULE | Refills: 3 | Status: ACTIVE | COMMUNITY
Start: 2022-08-17 | End: 2023-08-12

## 2023-04-24 RX ORDER — BALSALAZIDE DISODIUM 750 MG/1
6 CAPSULES CAPSULE ORAL ONCE DAILY
Qty: 180 CAPSULE | Refills: 11 | Status: ACTIVE | COMMUNITY

## 2023-10-12 ENCOUNTER — TELEPHONE ENCOUNTER (OUTPATIENT)
Dept: URBAN - METROPOLITAN AREA CLINIC 96 | Facility: CLINIC | Age: 38
End: 2023-10-12

## 2023-10-12 ENCOUNTER — LAB OUTSIDE AN ENCOUNTER (OUTPATIENT)
Dept: URBAN - METROPOLITAN AREA TELEHEALTH 2 | Facility: TELEHEALTH | Age: 38
End: 2023-10-12

## 2023-10-12 ENCOUNTER — OFFICE VISIT (OUTPATIENT)
Dept: URBAN - METROPOLITAN AREA TELEHEALTH 2 | Facility: TELEHEALTH | Age: 38
End: 2023-10-12
Payer: COMMERCIAL

## 2023-10-12 DIAGNOSIS — K92.1 HEMATOCHEZIA: ICD-10-CM

## 2023-10-12 DIAGNOSIS — K51.80 CHRONIC PANCOLONIC ULCERATIVE COLITIS: ICD-10-CM

## 2023-10-12 PROCEDURE — 99215 OFFICE O/P EST HI 40 MIN: CPT | Performed by: INTERNAL MEDICINE

## 2023-10-12 RX ORDER — SULFASALAZINE 500 MG/1
2 TABLET TABLET ORAL ONCE A DAY
Qty: 60 TABLET | Refills: 11 | OUTPATIENT

## 2023-10-12 RX ORDER — USTEKINUMAB 90 MG/ML
1 INJECTION INJECTION, SOLUTION SUBCUTANEOUS ONCE
Qty: 1 | Refills: 11 | OUTPATIENT

## 2023-10-12 RX ORDER — BALSALAZIDE DISODIUM 750 MG/1
6 CAPSULES CAPSULE ORAL ONCE DAILY
Qty: 180 CAPSULE | Refills: 11 | Status: ACTIVE | COMMUNITY

## 2023-10-12 RX ORDER — SODIUM, POTASSIUM,MAG SULFATES 17.5-3.13G
TAKE 177 ML SOLUTION, RECONSTITUTED, ORAL ORAL
Qty: 177 ML | Refills: 0 | OUTPATIENT
Start: 2023-10-12 | End: 2023-10-13

## 2023-10-12 RX ORDER — BALSALAZIDE DISODIUM 750 MG/1
6 CAPSULES CAPSULE ORAL ONCE DAILY
Qty: 180 CAPSULE | Refills: 11 | OUTPATIENT

## 2023-10-12 RX ORDER — USTEKINUMAB 90 MG/ML
1 INJECTION INJECTION, SOLUTION SUBCUTANEOUS ONCE
Qty: 1 | Refills: 11 | Status: ACTIVE | COMMUNITY
Start: 2023-04-07 | End: 2025-02-19

## 2023-10-12 RX ORDER — ADALIMUMAB 40MG/0.4ML
1 PEN KIT SUBCUTANEOUS
Qty: 2 | Refills: 11 | Status: ACTIVE | COMMUNITY
End: 2024-01-10

## 2023-10-12 RX ORDER — SULFASALAZINE 500 MG/1
2 TABLET TABLET ORAL ONCE A DAY
Qty: 60 TABLET | Refills: 11 | Status: ACTIVE | COMMUNITY

## 2023-10-12 NOTE — HPI-TODAY'S VISIT:
Magdiel Bonilla is a 36 y/o indiv with colonic UC,currently on Stelara (started April 2023) and previously on Humira and currentlyon Balsalazide (6 tab) and sulfasalazine (6 tab), here for eval of hercondition. . Pt is referred by Marc Romo.. She was dxd with UC in Summer 2018. She did verywell until she started on sulfasalzine, which worked very well for her; she hada lot of gastric/vaginal discomfort. This has been complicated by EN andpyoderma gangrenosum. She started on Humira on 9/2021, which has resolved herPG and EN. . Previously on 12/5/2022, pt reports that she didwell after colonoscopy with dilation on 11/2022. Had taken clindamycin prior tothe scope. Overall, doing very well. She has adopted a child. She has seen ascant amount of bleeding. . Previously on 4/7/2023, pt reports that she isasymptomatic. No diarrhea or rectal bleeding..Today on 10/12/2023, pt reports tolerating Stelarawell.  She otherwise has no diarrhea orrectal bleeding.  Occasional constipation.  No acid reflux sxs. 11/4/2022 - A moderate stenosis measuring 3 cm (in length) x1 cm (inner diameter) was found in the sigmoid colon and was non-traversed.A TTS dilator was passed through the scope. Dilation with a 6-7-8 mm, an 8-9-10mm and a 10-11-12 mm colonic balloon dilator was performed. The dilation sitewas examined following endoscope reinsertion and showed moderate mucosal disruption..Findings: - Diffuse inflammation was found in the rectum andin the recto-sigmoid colon. . 3/2022 (Dr. Hare):Colon, Recto-Sigmoid, Biopsy: PATCHY CHRONICACTIVE COLITIS, CONSISTENT WITH PARTIALLY TREATED ULCERATIVE COLITIS. Negativefor Infectious Organisms, Dysplasia or Malignancy.. 2/17/2023: Stenosis traversed. A TTS dilator was passedthrough the scope. Dilation with a 10-11-12, with max size 11mm colonic balloondilator was performed. The dilation site was examined following endoscopereinsertion and showed moderate mucosal disruption and moderate improvement in luminal narrowing

## 2023-10-16 ENCOUNTER — WEB ENCOUNTER (OUTPATIENT)
Dept: URBAN - METROPOLITAN AREA CLINIC 96 | Facility: CLINIC | Age: 38
End: 2023-10-16

## 2023-11-21 ENCOUNTER — WEB ENCOUNTER (OUTPATIENT)
Dept: URBAN - METROPOLITAN AREA CLINIC 96 | Facility: CLINIC | Age: 38
End: 2023-11-21

## 2023-12-01 ENCOUNTER — WEB ENCOUNTER (OUTPATIENT)
Dept: URBAN - METROPOLITAN AREA CLINIC 96 | Facility: CLINIC | Age: 38
End: 2023-12-01

## 2023-12-01 RX ORDER — SULFASALAZINE 500 MG/1
6 TABLET TABLET ORAL ONCE A DAY
Qty: 180 TABLET | Refills: 11
End: 2024-11-25

## 2023-12-29 LAB
A/G RATIO: 1.8
ALBUMIN: 4.1
ALKALINE PHOSPHATASE: 49
ALT (SGPT): 26
AST (SGOT): 30
BASO (ABSOLUTE): 0
BASOS: 0
BILIRUBIN, TOTAL: 0.3
BUN/CREATININE RATIO: 18
BUN: 12
CALCIUM: 8.6
CARBON DIOXIDE, TOTAL: 23
CHLORIDE: 102
CREATININE: 0.65
EGFR: 114
EOS (ABSOLUTE): 0.1
EOS: 2
GLOBULIN, TOTAL: 2.3
GLUCOSE: 92
HEMATOCRIT: 33.7
HEMATOLOGY COMMENTS:: (no result)
HEMOGLOBIN: 11
IMMATURE CELLS: (no result)
IMMATURE GRANS (ABS): 0
IMMATURE GRANULOCYTES: 0
LYMPHS (ABSOLUTE): 2
LYMPHS: 38
MCH: 32.4
MCHC: 32.6
MCV: 99
MONOCYTES(ABSOLUTE): 0.5
MONOCYTES: 10
NEUTROPHILS (ABSOLUTE): 2.7
NEUTROPHILS: 50
NRBC: (no result)
PLATELETS: 200
POTASSIUM: 4.2
PROTEIN, TOTAL: 6.4
QUANTIFERON CRITERIA: (no result)
QUANTIFERON INCUBATION: (no result)
QUANTIFERON MITOGEN VALUE: >10
QUANTIFERON NIL VALUE: 0.15
QUANTIFERON TB1 AG VALUE: 0.23
QUANTIFERON TB2 AG VALUE: 0.26
QUANTIFERON-TB GOLD PLUS: NEGATIVE
RBC: 3.39
RDW: 11.6
SODIUM: 139
VITAMIN B12: 668
VITAMIN D, 25-HYDROXY: 37
WBC: 5.4

## 2024-01-11 ENCOUNTER — TELEPHONE ENCOUNTER (OUTPATIENT)
Dept: URBAN - METROPOLITAN AREA CLINIC 96 | Facility: CLINIC | Age: 39
End: 2024-01-11

## 2024-01-11 RX ORDER — SULFASALAZINE 500 MG/1
6 TABLET TABLET ORAL ONCE A DAY
Qty: 180 TABLET | Refills: 11
End: 2025-01-05

## 2024-01-19 ENCOUNTER — OFFICE VISIT (OUTPATIENT)
Dept: URBAN - METROPOLITAN AREA MEDICAL CENTER 28 | Facility: MEDICAL CENTER | Age: 39
End: 2024-01-19
Payer: COMMERCIAL

## 2024-01-19 DIAGNOSIS — K51.80 CHRONIC PANCOLONIC ULCERATIVE COLITIS: ICD-10-CM

## 2024-01-19 DIAGNOSIS — K56.699 COLON STRICTURE: ICD-10-CM

## 2024-01-19 PROCEDURE — 45340 SIG W/TNDSC BALLOON DILATION: CPT | Performed by: INTERNAL MEDICINE

## 2024-01-19 RX ORDER — BALSALAZIDE DISODIUM 750 MG/1
6 CAPSULES CAPSULE ORAL ONCE DAILY
Qty: 180 CAPSULE | Refills: 11 | Status: ACTIVE | COMMUNITY

## 2024-01-19 RX ORDER — USTEKINUMAB 90 MG/ML
1 INJECTION INJECTION, SOLUTION SUBCUTANEOUS ONCE
Qty: 1 | Refills: 11 | Status: ACTIVE | COMMUNITY

## 2024-01-19 RX ORDER — SULFASALAZINE 500 MG/1
6 TABLET TABLET ORAL ONCE A DAY
Qty: 180 TABLET | Refills: 11 | Status: ACTIVE | COMMUNITY
End: 2025-01-05

## 2024-02-12 ENCOUNTER — LAB (OUTPATIENT)
Dept: URBAN - METROPOLITAN AREA TELEHEALTH 2 | Facility: TELEHEALTH | Age: 39
End: 2024-02-12

## 2024-02-12 ENCOUNTER — TELEP (OUTPATIENT)
Dept: URBAN - METROPOLITAN AREA TELEHEALTH 2 | Facility: TELEHEALTH | Age: 39
End: 2024-02-12
Payer: COMMERCIAL

## 2024-02-12 DIAGNOSIS — K51.80 CHRONIC PANCOLONIC ULCERATIVE COLITIS: ICD-10-CM

## 2024-02-12 DIAGNOSIS — K92.1 HEMATOCHEZIA: ICD-10-CM

## 2024-02-12 PROCEDURE — 99214 OFFICE O/P EST MOD 30 MIN: CPT | Performed by: INTERNAL MEDICINE

## 2024-02-12 RX ORDER — USTEKINUMAB 90 MG/ML
1 INJECTION INJECTION, SOLUTION SUBCUTANEOUS ONCE
Qty: 1 | Refills: 11 | OUTPATIENT

## 2024-02-12 RX ORDER — SODIUM, POTASSIUM,MAG SULFATES 17.5-3.13G
TAKE 177 ML SOLUTION, RECONSTITUTED, ORAL ORAL
Qty: 177 ML | Refills: 0 | OUTPATIENT
Start: 2024-02-12 | End: 2024-02-13

## 2024-02-12 RX ORDER — SULFASALAZINE 500 MG/1
2 TABLET TABLET ORAL ONCE A DAY
Qty: 60 TABLET | Refills: 11 | OUTPATIENT

## 2024-02-12 RX ORDER — BALSALAZIDE DISODIUM 750 MG/1
6 CAPSULES CAPSULE ORAL ONCE DAILY
Qty: 180 CAPSULE | Refills: 11 | Status: ACTIVE | COMMUNITY

## 2024-02-12 RX ORDER — USTEKINUMAB 90 MG/ML
1 INJECTION INJECTION, SOLUTION SUBCUTANEOUS ONCE
Qty: 1 | Refills: 11 | Status: ACTIVE | COMMUNITY

## 2024-02-12 RX ORDER — BALSALAZIDE DISODIUM 750 MG/1
6 CAPSULES CAPSULE ORAL ONCE DAILY
Qty: 180 CAPSULE | Refills: 11 | OUTPATIENT

## 2024-02-12 RX ORDER — SULFASALAZINE 500 MG/1
6 TABLET TABLET ORAL ONCE A DAY
Qty: 180 TABLET | Refills: 11 | Status: ACTIVE | COMMUNITY
End: 2025-01-05

## 2024-02-12 NOTE — HPI-TODAY'S VISIT:
Magdiel Bonilla is a 37 y/o indiv with colonic UC,currently on Stelara (started April 2023) and previously on Humira and currently on Balsalazide (6 tab) and sulfasalazine (6 tab), here for eval of her condition. . Pt is referred by Marc Romo.. She was dxd with UC in Summer 2018. She did verywell until she started on sulfasalzine, which worked very well for her; she hada lot of gastric/vaginal discomfort. This has been complicated by EN andpyoderma gangrenosum. She started on Humira on 9/2021, which has resolved herPG and EN. . Previously on 12/5/2022, pt reports that she didwell after colonoscopy with dilation on 11/2022. Had taken clindamycin prior tothe scope. Overall, doing very well. She has adopted a child. She has seen ascant amount of bleeding. . Previously on 4/7/2023, pt reports that she isasymptomatic. No diarrhea or rectal bleeding..Today on 10/12/2023, pt reports tolerating Stelara well.  She otherwise has no diarrhea orrectal bleeding.  Occasional constipation.  No acid reflux sxs. . Today on 2/12/2024, pt reports she is feeling very good.  Felt best she has ever after the dilation last month.   . 11/4/2022 - A moderate stenosis measuring 3 cm (in length) x1 cm (inner diameter) was found in the sigmoid colon and was non-traversed.A TTS dilator was passed through the scope. Dilation with a 6-7-8 mm, an 8-9-10mm and a 10-11-12 mm colonic balloon dilator was performed. The dilation sitewas examined following endoscope reinsertion and showed moderate mucosal disruption..Findings: - Diffuse inflammation was found in the rectum andin the recto-sigmoid colon. . 3/2022 (Dr. Hare):Colon, Recto-Sigmoid, Biopsy: PATCHY CHRONICACTIVE COLITIS, CONSISTENT WITH PARTIALLY TREATED ULCERATIVE COLITIS. Negativefor Infectious Organisms, Dysplasia or Malignancy. . 2/17/2023: Stenosis traversed. A TTS dilator was passedthrough the scope. Dilation with a 10-11-12, with max size 11mm colonic balloondilator was performed. The dilation site was examined following endoscopereinsertion and showed moderate mucosal disruption and moderate improvement in luminal narrowing .  1/19/2024: balloon up to 15-18mm was performed of sigmoid stricture

## 2024-05-06 ENCOUNTER — WEB ENCOUNTER (OUTPATIENT)
Dept: URBAN - METROPOLITAN AREA CLINIC 96 | Facility: CLINIC | Age: 39
End: 2024-05-06

## 2024-05-07 ENCOUNTER — WEB ENCOUNTER (OUTPATIENT)
Dept: URBAN - METROPOLITAN AREA CLINIC 96 | Facility: CLINIC | Age: 39
End: 2024-05-07

## 2024-05-15 NOTE — PHYSICAL EXAM GASTROINTESTINAL
Abdomen , soft, nontender, nondistended , no guarding or rigidity , no masses palpable , normal bowel sounds , Liver and Spleen , no hepatomegaly present , no hepatosplenomegaly , liver nontender , spleen not palpable
Detail Level: Zone
Render In Strict Bullet Format?: No
Initiate Treatment: betamethasone dipropionate 0.05 % topical ointment \\nApply twice daily to an eczema on legs for to 2 weeks. Then twice a day for weekends

## 2024-06-21 ENCOUNTER — OFFICE VISIT (OUTPATIENT)
Dept: URBAN - METROPOLITAN AREA MEDICAL CENTER 28 | Facility: MEDICAL CENTER | Age: 39
End: 2024-06-21
Payer: COMMERCIAL

## 2024-06-21 DIAGNOSIS — K51.00 CHRONIC ULCERATIVE PANCOLITIS: ICD-10-CM

## 2024-06-21 DIAGNOSIS — K56.699 OTHER SPECIFIED INTESTINAL OBSTRUCTION, UNSPECIFIED WHETHER PARTIAL OR COMPLETE: ICD-10-CM

## 2024-06-21 PROCEDURE — 45340 SIG W/TNDSC BALLOON DILATION: CPT | Performed by: INTERNAL MEDICINE

## 2024-06-21 RX ORDER — USTEKINUMAB 90 MG/ML
1 INJECTION INJECTION, SOLUTION SUBCUTANEOUS ONCE
Qty: 1 | Refills: 11 | Status: ACTIVE | COMMUNITY

## 2024-06-21 RX ORDER — SULFASALAZINE 500 MG/1
2 TABLET TABLET ORAL ONCE A DAY
Qty: 60 TABLET | Refills: 11 | Status: ACTIVE | COMMUNITY

## 2024-06-21 RX ORDER — BALSALAZIDE DISODIUM 750 MG/1
6 CAPSULES CAPSULE ORAL ONCE DAILY
Qty: 180 CAPSULE | Refills: 11 | Status: ACTIVE | COMMUNITY

## 2024-06-21 NOTE — HPI-TODAY'S VISIT:
Magdiel Bonilla is a 39 y/o indiv with colonic UC,currently on Stelara (started April 2023) and previously on Humira and currently on Balsalazide (6 tab) and sulfasalazine (6 tab), here for eval of her condition. . Pt is referred by Marc Romo.. She was dxd with UC in Summer 2018. She did verywell until she started on sulfasalzine, which worked very well for her; she hada lot of gastric/vaginal discomfort. This has been complicated by EN andpyoderma gangrenosum. She started on Humira on 9/2021, which has resolved herPG and EN. . Previously on 12/5/2022, pt reports that she didwell after colonoscopy with dilation on 11/2022. Had taken clindamycin prior tothe scope. Overall, doing very well. She has adopted a child. She has seen ascant amount of bleeding. . Previously on 4/7/2023, pt reports that she isasymptomatic. No diarrhea or rectal bleeding..Today on 10/12/2023, pt reports tolerating Stelara well.  She otherwise has no diarrhea orrectal bleeding.  Occasional constipation.  No acid reflux sxs. . Today on 2/12/2024, pt reports she is feeling very good.  Felt best she has ever after the dilation last month.   . 11/4/2022 - A moderate stenosis measuring 3 cm (in length) x1 cm (inner diameter) was found in the sigmoid colon and was non-traversed.A TTS dilator was passed through the scope. Dilation with a 6-7-8 mm, an 8-9-10mm and a 10-11-12 mm colonic balloon dilator was performed. The dilation sitewas examined following endoscope reinsertion and showed moderate mucosal disruption..Findings: - Diffuse inflammation was found in the rectum andin the recto-sigmoid colon. . 3/2022 (Dr. Hare):Colon, Recto-Sigmoid, Biopsy: PATCHY CHRONICACTIVE COLITIS, CONSISTENT WITH PARTIALLY TREATED ULCERATIVE COLITIS. Negativefor Infectious Organisms, Dysplasia or Malignancy. . 2/17/2023: Stenosis traversed. A TTS dilator was passedthrough the scope. Dilation with a 10-11-12, with max size 11mm colonic balloondilator was performed. The dilation site was examined following endoscopereinsertion and showed moderate mucosal disruption and moderate improvement in luminal narrowing .  1/19/2024: balloon up to 15-18mm was performed of sigmoid stricture . 6/21/2024: - A moderate stenosis was found in the sigmoid colon and was non-traversed.  A TTS dilator was passed through the  scope.  Dilation with a 15-16.5-18 mm colonic balloon dilator was performed.  The dilation site was examined following  endoscope reinsertion and showed moderate mucosal disruption and moderate improvement in luminal narrowing.  Findings: - The exam was otherwise without abnormality on direct and retroflexion views.

## 2024-08-20 ENCOUNTER — WEB ENCOUNTER (OUTPATIENT)
Dept: URBAN - METROPOLITAN AREA CLINIC 96 | Facility: CLINIC | Age: 39
End: 2024-08-20

## 2024-09-12 ENCOUNTER — TELEPHONE ENCOUNTER (OUTPATIENT)
Dept: URBAN - METROPOLITAN AREA CLINIC 96 | Facility: CLINIC | Age: 39
End: 2024-09-12

## 2024-09-18 ENCOUNTER — DASHBOARD ENCOUNTERS (OUTPATIENT)
Age: 39
End: 2024-09-18

## 2024-09-23 ENCOUNTER — OFFICE VISIT (OUTPATIENT)
Dept: URBAN - METROPOLITAN AREA TELEHEALTH 2 | Facility: TELEHEALTH | Age: 39
End: 2024-09-23
Payer: COMMERCIAL

## 2024-09-23 ENCOUNTER — LAB OUTSIDE AN ENCOUNTER (OUTPATIENT)
Dept: URBAN - METROPOLITAN AREA TELEHEALTH 2 | Facility: TELEHEALTH | Age: 39
End: 2024-09-23

## 2024-09-23 ENCOUNTER — OFFICE VISIT (OUTPATIENT)
Dept: URBAN - METROPOLITAN AREA TELEHEALTH 2 | Facility: TELEHEALTH | Age: 39
End: 2024-09-23

## 2024-09-23 ENCOUNTER — TELEPHONE ENCOUNTER (OUTPATIENT)
Dept: URBAN - METROPOLITAN AREA CLINIC 96 | Facility: CLINIC | Age: 39
End: 2024-09-23

## 2024-09-23 DIAGNOSIS — K51.80 CHRONIC PANCOLONIC ULCERATIVE COLITIS: ICD-10-CM

## 2024-09-23 DIAGNOSIS — Z79.899 LONG-TERM USE OF IMMUNOSUPPRESSANT MEDICATION: ICD-10-CM

## 2024-09-23 DIAGNOSIS — K92.1 HEMATOCHEZIA: ICD-10-CM

## 2024-09-23 PROCEDURE — 99215 OFFICE O/P EST HI 40 MIN: CPT | Performed by: INTERNAL MEDICINE

## 2024-09-23 RX ORDER — SULFASALAZINE 500 MG/1
2 TABLET TABLET ORAL ONCE A DAY
Qty: 60 TABLET | Refills: 11 | COMMUNITY

## 2024-09-23 RX ORDER — USTEKINUMAB 90 MG/ML
1 INJECTION INJECTION, SOLUTION SUBCUTANEOUS ONCE
Qty: 1 | Refills: 11 | OUTPATIENT

## 2024-09-23 RX ORDER — SULFASALAZINE 500 MG/1
4 TABLET TABLET ORAL ONCE A DAY
Qty: 120 TABLET | Refills: 11 | OUTPATIENT

## 2024-09-23 RX ORDER — BALSALAZIDE DISODIUM 750 MG/1
4 CAPSULES CAPSULE ORAL ONCE DAILY
Qty: 120 | Refills: 11 | OUTPATIENT

## 2024-09-23 RX ORDER — USTEKINUMAB 90 MG/ML
1 INJECTION INJECTION, SOLUTION SUBCUTANEOUS ONCE
Qty: 1 | Refills: 11 | COMMUNITY

## 2024-09-23 RX ORDER — BALSALAZIDE DISODIUM 750 MG/1
6 CAPSULES CAPSULE ORAL ONCE DAILY
Qty: 180 CAPSULE | Refills: 11 | COMMUNITY

## 2024-09-23 NOTE — HPI-TODAY'S VISIT:
Pt Chad is a 38 y/o indiv with colonic UC, currently on Stelara (started April 2023) and previously on Humira and currently on Balsalazide (4 tab) and sulfasalazine (4 tab), here for eval of her condition.. . Pt is referred by Marc Romo.. She was dxd with UC in Summer 2018. She did verywell until she started on sulfasalzine, which worked very well for her; she hada lot of gastric/vaginal discomfort. This has been complicated by EN andpyoderma gangrenosum. She started on Humira on 9/2021, which has resolved herPG and EN. . Previously on 12/5/2022, pt reports that she didwell after colonoscopy with dilation on 11/2022. Had taken clindamycin prior tothe scope. Overall, doing very well. She has adopted a child. She has seen ascant amount of bleeding. . Previously on 4/7/2023, pt reports that she isasymptomatic. No diarrhea or rectal bleeding..Today on 10/12/2023, pt reports tolerating Stelara well.  She otherwise has no diarrhea orrectal bleeding.  Occasional constipation.  No acid reflux sxs. . Previously on 2/12/2024, pt reports she is feeling very good.  Felt best she has ever after the dilation last month.   . Today on 9/23/2024, pt reports that since her last dilation, she started on GLP-1 medication, which caused some constipation.  Lost about 15lbs of weight.  Has seen a slight amount of bleeding and urgency sxs.   . 11/4/2022 - A moderate stenosis measuring 3 cm (in length) x1 cm (inner diameter) was found in the sigmoid colon and was non-traversed.A TTS dilator was passed through the scope. Dilation with a 6-7-8 mm, an 8-9-10mm and a 10-11-12 mm colonic balloon dilator was performed. The dilation sitewas examined following endoscope reinsertion and showed moderate mucosal disruption..Findings: - Diffuse inflammation was found in the rectum andin the recto-sigmoid colon. . 3/2022 (Dr. Hare):Colon, Recto-Sigmoid, Biopsy: PATCHY CHRONICACTIVE COLITIS, CONSISTENT WITH PARTIALLY TREATED ULCERATIVE COLITIS. Negativefor Infectious Organisms, Dysplasia or Malignancy. . 2/17/2023: Stenosis traversed. A TTS dilator was passedthrough the scope. Dilation with a 10-11-12, with max size 11mm colonic balloondilator was performed. The dilation site was examined following endoscopereinsertion and showed moderate mucosal disruption and moderate improvement in luminal narrowing .  1/19/2024: balloon up to 15-18mm was performed of sigmoid stricture . 6/21/2024: - A moderate stenosis was found in the sigmoid colon and was non-traversed.  A TTS dilator was passed through the  scope.  Dilation with a 15-16.5-18 mm colonic balloon dilator was performed.  The dilation site was examined following  endoscope reinsertion and showed moderate mucosal disruption and moderate improvement in luminal narrowing.  Findings: - The exam was otherwise without abnormality on direct and retroflexion views.

## 2024-12-30 ENCOUNTER — OFFICE VISIT (OUTPATIENT)
Dept: URBAN - METROPOLITAN AREA MEDICAL CENTER 28 | Facility: MEDICAL CENTER | Age: 39
End: 2024-12-30

## 2024-12-30 RX ORDER — SULFASALAZINE 500 MG/1
4 TABLET TABLET ORAL ONCE A DAY
Qty: 120 TABLET | Refills: 11 | OUTPATIENT

## 2024-12-30 RX ORDER — BALSALAZIDE DISODIUM 750 MG/1
4 CAPSULES CAPSULE ORAL ONCE DAILY
Qty: 120 | Refills: 11 | OUTPATIENT

## 2024-12-30 RX ORDER — SULFASALAZINE 500 MG/1
4 TABLET TABLET ORAL ONCE A DAY
Qty: 120 TABLET | Refills: 11 | Status: ACTIVE | COMMUNITY

## 2024-12-30 RX ORDER — USTEKINUMAB 90 MG/ML
1 INJECTION INJECTION, SOLUTION SUBCUTANEOUS ONCE
Qty: 1 | Refills: 11 | Status: ACTIVE | COMMUNITY

## 2024-12-30 RX ORDER — USTEKINUMAB 90 MG/ML
1 INJECTION INJECTION, SOLUTION SUBCUTANEOUS ONCE
Qty: 1 | Refills: 11 | OUTPATIENT

## 2024-12-30 RX ORDER — BALSALAZIDE DISODIUM 750 MG/1
4 CAPSULES CAPSULE ORAL ONCE DAILY
Qty: 120 | Refills: 11 | Status: ACTIVE | COMMUNITY

## 2024-12-30 NOTE — HPI-TODAY'S VISIT:
Pt Chad is a 40 y/o indiv with colonic UC, currently on Stelara (started April 2023) and previously on Humira and currently on Balsalazide (4 tab) and sulfasalazine (4 tab), here for eval of her condition.. . Pt is referred by Marc Romo.. She was dxd with UC in Summer 2018. She did verywell until she started on sulfasalzine, which worked very well for her; she hada lot of gastric/vaginal discomfort. This has been complicated by EN andpyoderma gangrenosum. She started on Humira on 9/2021, which has resolved herPG and EN. . Previously on 12/5/2022, pt reports that she didwell after colonoscopy with dilation on 11/2022. Had taken clindamycin prior tothe scope. Overall, doing very well. She has adopted a child. She has seen ascant amount of bleeding. . Previously on 4/7/2023, pt reports that she isasymptomatic. No diarrhea or rectal bleeding..Today on 10/12/2023, pt reports tolerating Stelara well.  She otherwise has no diarrhea orrectal bleeding.  Occasional constipation.  No acid reflux sxs. . Previously on 2/12/2024, pt reports she is feeling very good.  Felt best she has ever after the dilation last month.   . Today on 9/23/2024, pt reports that since her last dilation, she started on GLP-1 medication, which caused some constipation.  Lost about 15lbs of weight.  Has seen a slight amount of bleeding and urgency sxs.   . 11/4/2022 - A moderate stenosis measuring 3 cm (in length) x1 cm (inner diameter) was found in the sigmoid colon and was non-traversed.A TTS dilator was passed through the scope. Dilation with a 6-7-8 mm, an 8-9-10mm and a 10-11-12 mm colonic balloon dilator was performed. The dilation sitewas examined following endoscope reinsertion and showed moderate mucosal disruption..Findings: - Diffuse inflammation was found in the rectum andin the recto-sigmoid colon. . 3/2022 (Dr. Hare):Colon, Recto-Sigmoid, Biopsy: PATCHY CHRONICACTIVE COLITIS, CONSISTENT WITH PARTIALLY TREATED ULCERATIVE COLITIS. Negativefor Infectious Organisms, Dysplasia or Malignancy. . 2/17/2023: Stenosis traversed. A TTS dilator was passedthrough the scope. Dilation with a 10-11-12, with max size 11mm colonic balloondilator was performed. The dilation site was examined following endoscopereinsertion and showed moderate mucosal disruption and moderate improvement in luminal narrowing .  1/19/2024: balloon up to 15-18mm was performed of sigmoid stricture . 6/21/2024: - A moderate stenosis was found in the sigmoid colon and was non-traversed.  A TTS dilator was passed through the  scope.  Dilation with a 15-16.5-18 mm colonic balloon dilator was performed.  The dilation site was examined following  endoscope reinsertion and showed moderate mucosal disruption and moderate improvement in luminal narrowing.  Findings: - The exam was otherwise without abnormality on direct and retroflexion views. . 12/2024: - The ileum appeared normal.  Findings: - The ascending colon appeared normal.  Biopsies were taken with a cold forceps for histology.  The pathology  specimen was placed into Bottle Number 1.  - The transverse colon appeared normal.  Biopsies were taken with a cold forceps for histology.  The pathology  specimen was placed into Bottle Number 2.  - The descending colon appeared normal.  Biopsies were taken with a cold forceps for histology.  The pathology  specimen was placed into Bottle Number 3.  - A diffuse area of mildly erythematous mucosa was found in the sigmoid colon.  This was biopsied with a cold forceps  for histology.  The pathology specimen was placed into Bottle Number 4.  - Diffuse moderate inflammation characterized by erythema, friability and deep ulcerations was found in the rectum.   Biopsies were taken with a cold forceps for histology.  The pathology specimen was placed into Bottle Number 5.  - A mild stenosis was found in the distal sigmoid colon and was traversed.  A TTS dilator was passed through the scope.   Dilation with an 18-19-20 mm colonic balloon dilator was performed. Mild mucosal injury seen with slight bleeding, no  tears created.

## 2025-05-19 NOTE — PHYSICAL EXAM CONSTITUTIONAL:
Refill Request     Last Seen: Last Seen Department: Visit date not found  Last Seen by PCP: Visit date not found    Last Written: 04/18/2025      Next Appointment:   Future Appointments   Date Time Provider Department Center   6/11/2025  8:30 AM Robin Zavala DO [place] [facility]   9/3/2025  8:40 AM Brooke Oneal APRN - CNP [facility]       Requested Prescriptions     Pending Prescriptions Disp Refills    metoprolol tartrate (LOPRESSOR) 50 MG tablet [Pharmacy Med Name: METOPROLOL TART 50MG TABLET 50 Tablet] 60 tablet 11     Sig: TAKE 1 TABLET BY MOUTH TWICE DAILY    atorvastatin (LIPITOR) 40 MG tablet [Pharmacy Med Name: ATORVASTATIN 40MG TABLET 40 Tablet] 30 tablet 11     Sig: TAKE 1 TABLET BY MOUTH NIGHTLY    diclofenac (VOLTAREN) 75 MG EC tablet [Pharmacy Med Name: DICLOFENAC SOD DR 75MG TAB 75 Tablet] 60 tablet 11     Sig: TAKE 1 TABLET BY MOUTH TWICE DAILY        well developed, well nourished , in no acute distress , ambulating without difficulty , normal communication ability

## 2025-07-21 ENCOUNTER — WEB ENCOUNTER (OUTPATIENT)
Dept: URBAN - METROPOLITAN AREA CLINIC 96 | Facility: CLINIC | Age: 40
End: 2025-07-21

## 2025-07-21 RX ORDER — USTEKINUMAB 90 MG/ML
1 INJECTION INJECTION, SOLUTION SUBCUTANEOUS ONCE
Qty: 1 | Refills: 11
End: 2027-05-27

## 2025-07-21 RX ORDER — PREDNISONE 10 MG/1
1 TABLET TABLET ORAL ONCE A DAY
Qty: 30 TABLET | Refills: 0 | OUTPATIENT
Start: 2025-07-22

## 2025-07-23 ENCOUNTER — CLAIMS CREATED FROM THE CLAIM WINDOW (OUTPATIENT)
Dept: URBAN - METROPOLITAN AREA MEDICAL CENTER 25 | Facility: MEDICAL CENTER | Age: 40
End: 2025-07-23

## 2025-07-23 PROCEDURE — 99255 IP/OBS CONSLTJ NEW/EST HI 80: CPT | Performed by: INTERNAL MEDICINE

## 2025-07-24 ENCOUNTER — CLAIMS CREATED FROM THE CLAIM WINDOW (OUTPATIENT)
Dept: URBAN - METROPOLITAN AREA MEDICAL CENTER 25 | Facility: MEDICAL CENTER | Age: 40
End: 2025-07-24

## 2025-07-24 PROCEDURE — 45380 COLONOSCOPY AND BIOPSY: CPT | Performed by: INTERNAL MEDICINE

## 2025-07-25 ENCOUNTER — CLAIMS CREATED FROM THE CLAIM WINDOW (OUTPATIENT)
Dept: URBAN - METROPOLITAN AREA MEDICAL CENTER 25 | Facility: MEDICAL CENTER | Age: 40
End: 2025-07-25

## 2025-07-25 PROCEDURE — 99233 SBSQ HOSP IP/OBS HIGH 50: CPT | Performed by: INTERNAL MEDICINE

## 2025-07-26 ENCOUNTER — CLAIMS CREATED FROM THE CLAIM WINDOW (OUTPATIENT)
Dept: URBAN - METROPOLITAN AREA MEDICAL CENTER 25 | Facility: MEDICAL CENTER | Age: 40
End: 2025-07-26

## 2025-07-26 PROCEDURE — 99233 SBSQ HOSP IP/OBS HIGH 50: CPT | Performed by: INTERNAL MEDICINE

## 2025-07-27 ENCOUNTER — CLAIMS CREATED FROM THE CLAIM WINDOW (OUTPATIENT)
Dept: URBAN - METROPOLITAN AREA MEDICAL CENTER 25 | Facility: MEDICAL CENTER | Age: 40
End: 2025-07-27

## 2025-07-27 PROCEDURE — 99233 SBSQ HOSP IP/OBS HIGH 50: CPT | Performed by: INTERNAL MEDICINE

## 2025-07-28 ENCOUNTER — CLAIMS CREATED FROM THE CLAIM WINDOW (OUTPATIENT)
Dept: URBAN - METROPOLITAN AREA MEDICAL CENTER 25 | Facility: MEDICAL CENTER | Age: 40
End: 2025-07-28

## 2025-07-28 PROCEDURE — 99231 SBSQ HOSP IP/OBS SF/LOW 25: CPT | Performed by: STUDENT IN AN ORGANIZED HEALTH CARE EDUCATION/TRAINING PROGRAM

## 2025-07-29 ENCOUNTER — CLAIMS CREATED FROM THE CLAIM WINDOW (OUTPATIENT)
Dept: URBAN - METROPOLITAN AREA MEDICAL CENTER 25 | Facility: MEDICAL CENTER | Age: 40
End: 2025-07-29

## 2025-07-29 ENCOUNTER — LAB OUTSIDE AN ENCOUNTER (OUTPATIENT)
Dept: URBAN - METROPOLITAN AREA CLINIC 19 | Facility: CLINIC | Age: 40
End: 2025-07-29

## 2025-07-29 PROCEDURE — 99231 SBSQ HOSP IP/OBS SF/LOW 25: CPT | Performed by: STUDENT IN AN ORGANIZED HEALTH CARE EDUCATION/TRAINING PROGRAM

## 2025-07-30 ENCOUNTER — TELEPHONE ENCOUNTER (OUTPATIENT)
Dept: URBAN - METROPOLITAN AREA CLINIC 19 | Facility: CLINIC | Age: 40
End: 2025-07-30

## 2025-07-30 ENCOUNTER — CLAIMS CREATED FROM THE CLAIM WINDOW (OUTPATIENT)
Dept: URBAN - METROPOLITAN AREA MEDICAL CENTER 25 | Facility: MEDICAL CENTER | Age: 40
End: 2025-07-30

## 2025-07-30 PROCEDURE — 99233 SBSQ HOSP IP/OBS HIGH 50: CPT | Performed by: STUDENT IN AN ORGANIZED HEALTH CARE EDUCATION/TRAINING PROGRAM

## 2025-07-31 ENCOUNTER — TELEPHONE ENCOUNTER (OUTPATIENT)
Dept: URBAN - METROPOLITAN AREA CLINIC 96 | Facility: CLINIC | Age: 40
End: 2025-07-31

## 2025-07-31 RX ORDER — BALSALAZIDE DISODIUM 750 MG/1
4 CAPSULES CAPSULE ORAL ONCE DAILY
Qty: 120 | Refills: 11

## 2025-07-31 RX ORDER — SULFASALAZINE 500 MG/1
4 TABLET TABLET ORAL ONCE A DAY
Qty: 120 TABLET | Refills: 11
End: 2026-07-26

## 2025-07-31 RX ORDER — USTEKINUMAB 90 MG/ML
1 INJECTION INJECTION, SOLUTION SUBCUTANEOUS ONCE
Qty: 1 | Refills: 11 | OUTPATIENT
Start: 2025-07-31 | End: 2027-06-03

## 2025-08-02 ENCOUNTER — LAB OUTSIDE AN ENCOUNTER (OUTPATIENT)
Dept: URBAN - METROPOLITAN AREA CLINIC 19 | Facility: CLINIC | Age: 40
End: 2025-08-02

## 2025-08-02 ENCOUNTER — CLAIMS CREATED FROM THE CLAIM WINDOW (OUTPATIENT)
Dept: URBAN - METROPOLITAN AREA MEDICAL CENTER 25 | Facility: MEDICAL CENTER | Age: 40
End: 2025-08-02

## 2025-08-02 ENCOUNTER — CLAIMS CREATED FROM THE CLAIM WINDOW (OUTPATIENT)
Dept: URBAN - METROPOLITAN AREA MEDICAL CENTER 25 | Facility: MEDICAL CENTER | Age: 40
End: 2025-08-02
Payer: COMMERCIAL

## 2025-08-02 DIAGNOSIS — K51.80 OTHER ULCERATIVE COLITIS: ICD-10-CM

## 2025-08-02 DIAGNOSIS — K92.1 HEMATOCHEZIA: ICD-10-CM

## 2025-08-02 PROCEDURE — 99223 1ST HOSP IP/OBS HIGH 75: CPT | Performed by: STUDENT IN AN ORGANIZED HEALTH CARE EDUCATION/TRAINING PROGRAM

## 2025-08-02 PROCEDURE — 99255 IP/OBS CONSLTJ NEW/EST HI 80: CPT | Performed by: STUDENT IN AN ORGANIZED HEALTH CARE EDUCATION/TRAINING PROGRAM

## 2025-08-02 PROCEDURE — G8427 DOCREV CUR MEDS BY ELIG CLIN: HCPCS | Performed by: STUDENT IN AN ORGANIZED HEALTH CARE EDUCATION/TRAINING PROGRAM

## 2025-08-03 ENCOUNTER — CLAIMS CREATED FROM THE CLAIM WINDOW (OUTPATIENT)
Dept: URBAN - METROPOLITAN AREA MEDICAL CENTER 25 | Facility: MEDICAL CENTER | Age: 40
End: 2025-08-03
Payer: COMMERCIAL

## 2025-08-03 DIAGNOSIS — K51.80 OTHER ULCERATIVE COLITIS: ICD-10-CM

## 2025-08-03 DIAGNOSIS — K92.1 HEMATOCHEZIA: ICD-10-CM

## 2025-08-03 PROCEDURE — 99233 SBSQ HOSP IP/OBS HIGH 50: CPT | Performed by: STUDENT IN AN ORGANIZED HEALTH CARE EDUCATION/TRAINING PROGRAM

## 2025-08-04 ENCOUNTER — TELEPHONE ENCOUNTER (OUTPATIENT)
Dept: URBAN - METROPOLITAN AREA CLINIC 96 | Facility: CLINIC | Age: 40
End: 2025-08-04

## 2025-08-04 ENCOUNTER — LAB OUTSIDE AN ENCOUNTER (OUTPATIENT)
Dept: URBAN - METROPOLITAN AREA CLINIC 19 | Facility: CLINIC | Age: 40
End: 2025-08-04

## 2025-08-04 ENCOUNTER — CLAIMS CREATED FROM THE CLAIM WINDOW (OUTPATIENT)
Dept: URBAN - METROPOLITAN AREA MEDICAL CENTER 25 | Facility: MEDICAL CENTER | Age: 40
End: 2025-08-04
Payer: COMMERCIAL

## 2025-08-04 DIAGNOSIS — K51.818 OTHER ULCERATIVE COLITIS WITH OTHER COMPLICATION: ICD-10-CM

## 2025-08-04 DIAGNOSIS — R19.7 DIARRHEA: ICD-10-CM

## 2025-08-04 PROCEDURE — 99232 SBSQ HOSP IP/OBS MODERATE 35: CPT | Performed by: INTERNAL MEDICINE

## 2025-08-05 ENCOUNTER — CLAIMS CREATED FROM THE CLAIM WINDOW (OUTPATIENT)
Dept: URBAN - METROPOLITAN AREA MEDICAL CENTER 25 | Facility: MEDICAL CENTER | Age: 40
End: 2025-08-05
Payer: COMMERCIAL

## 2025-08-05 DIAGNOSIS — R63.8 POOR FLUID INTAKE: ICD-10-CM

## 2025-08-05 DIAGNOSIS — K51.80 OTHER ULCERATIVE COLITIS: ICD-10-CM

## 2025-08-05 PROCEDURE — 99232 SBSQ HOSP IP/OBS MODERATE 35: CPT | Performed by: INTERNAL MEDICINE

## 2025-08-12 ENCOUNTER — TELEPHONE ENCOUNTER (OUTPATIENT)
Dept: URBAN - METROPOLITAN AREA CLINIC 96 | Facility: CLINIC | Age: 40
End: 2025-08-12

## 2025-08-12 ENCOUNTER — OFFICE VISIT (OUTPATIENT)
Dept: URBAN - METROPOLITAN AREA TELEHEALTH 2 | Facility: TELEHEALTH | Age: 40
End: 2025-08-12
Payer: COMMERCIAL

## 2025-08-12 DIAGNOSIS — K51.00 ULCERATIVE CHRONIC PANCOLITIS: ICD-10-CM

## 2025-08-12 DIAGNOSIS — Z79.899 LONG-TERM USE OF IMMUNOSUPPRESSANT MEDICATION: ICD-10-CM

## 2025-08-12 DIAGNOSIS — K92.1 HEMATOCHEZIA: ICD-10-CM

## 2025-08-12 DIAGNOSIS — E61.1 IRON DEFICIENCY: ICD-10-CM

## 2025-08-12 PROCEDURE — 99215 OFFICE O/P EST HI 40 MIN: CPT | Performed by: INTERNAL MEDICINE

## 2025-08-12 RX ORDER — USTEKINUMAB 90 MG/ML
1 INJECTION INJECTION, SOLUTION SUBCUTANEOUS ONCE
Qty: 1 | Refills: 11 | Status: ON HOLD | COMMUNITY
End: 2027-05-27

## 2025-08-12 RX ORDER — USTEKINUMAB 90 MG/ML
1 INJECTION INJECTION, SOLUTION SUBCUTANEOUS ONCE
Qty: 1 | Refills: 11 | Status: ACTIVE | COMMUNITY
Start: 2025-07-31 | End: 2027-06-03

## 2025-08-12 RX ORDER — SULFASALAZINE 500 MG/1
6 TABLET TABLET ORAL ONCE A DAY
Qty: 180 TABLET | Refills: 11 | OUTPATIENT
Start: 2025-08-12 | End: 2026-08-07

## 2025-08-12 RX ORDER — SULFASALAZINE 500 MG/1
4 TABLET TABLET ORAL ONCE A DAY
Qty: 120 TABLET | Refills: 11 | Status: ACTIVE | COMMUNITY
End: 2026-07-26

## 2025-08-12 RX ORDER — PREDNISONE 10 MG/1
1 TABLET TABLET ORAL ONCE A DAY
Qty: 30 TABLET | Refills: 0 | Status: ACTIVE | COMMUNITY
Start: 2025-07-22

## 2025-08-12 RX ORDER — USTEKINUMAB 90 MG/ML
1 INJECTION INJECTION, SOLUTION SUBCUTANEOUS ONCE
Qty: 1 | Refills: 11 | OUTPATIENT
Start: 2025-08-12 | End: 2027-06-15

## 2025-08-12 RX ORDER — FERRIC CARBOXYMALTOSE INJECTION 50 MG/ML
AS DIRECTED INJECTION, SOLUTION INTRAVENOUS
OUTPATIENT
Start: 2025-08-12 | End: 2025-08-12

## 2025-08-12 RX ORDER — PREDNISONE 10 MG/1
4 TABLET TABLET ORAL ONCE A DAY
Qty: 120 TABLET | Refills: 1 | OUTPATIENT
Start: 2025-08-12

## 2025-08-12 RX ORDER — BALSALAZIDE DISODIUM 750 MG/1
6 CAPSULES CAPSULE ORAL ONCE DAILY
Qty: 180 CAPSULE | Refills: 11 | OUTPATIENT
Start: 2025-08-12

## 2025-08-12 RX ORDER — BALSALAZIDE DISODIUM 750 MG/1
4 CAPSULES CAPSULE ORAL ONCE DAILY
Qty: 120 | Refills: 11 | Status: ACTIVE | COMMUNITY

## 2025-08-13 ENCOUNTER — TELEPHONE ENCOUNTER (OUTPATIENT)
Dept: URBAN - METROPOLITAN AREA CLINIC 96 | Facility: CLINIC | Age: 40
End: 2025-08-13

## 2025-08-13 RX ORDER — USTEKINUMAB 90 MG/ML
1 INJECTION INJECTION, SOLUTION SUBCUTANEOUS ONCE
Qty: 1 | Refills: 11
Start: 2025-08-12 | End: 2027-06-17

## 2025-08-15 ENCOUNTER — TELEPHONE ENCOUNTER (OUTPATIENT)
Dept: URBAN - METROPOLITAN AREA CLINIC 97 | Facility: CLINIC | Age: 40
End: 2025-08-15

## 2025-08-18 ENCOUNTER — TELEPHONE ENCOUNTER (OUTPATIENT)
Dept: URBAN - METROPOLITAN AREA CLINIC 96 | Facility: CLINIC | Age: 40
End: 2025-08-18

## 2025-08-19 ENCOUNTER — TELEPHONE ENCOUNTER (OUTPATIENT)
Dept: URBAN - METROPOLITAN AREA CLINIC 23 | Facility: CLINIC | Age: 40
End: 2025-08-19

## 2025-08-20 LAB
QUANTIFERON CRITERIA: (no result)
QUANTIFERON INCUBATION: (no result)
QUANTIFERON MITOGEN VALUE: 0.55
QUANTIFERON NIL VALUE: 0.03
QUANTIFERON TB1 AG VALUE: 0.01
QUANTIFERON TB2 AG VALUE: 0.01
QUANTIFERON-TB GOLD PLUS: NEGATIVE

## 2025-08-21 ENCOUNTER — TELEPHONE ENCOUNTER (OUTPATIENT)
Dept: URBAN - METROPOLITAN AREA CLINIC 96 | Facility: CLINIC | Age: 40
End: 2025-08-21

## 2025-08-26 ENCOUNTER — TELEPHONE ENCOUNTER (OUTPATIENT)
Dept: URBAN - METROPOLITAN AREA CLINIC 6 | Facility: CLINIC | Age: 40
End: 2025-08-26

## 2025-08-28 ENCOUNTER — WEB ENCOUNTER (OUTPATIENT)
Dept: URBAN - METROPOLITAN AREA CLINIC 98 | Facility: CLINIC | Age: 40
End: 2025-08-28

## 2025-08-28 RX ORDER — TRAMADOL HYDROCHLORIDE 50 MG/1
1 TABLET AS NEEDED TABLET, FILM COATED ORAL
Qty: 60 | Refills: 3 | OUTPATIENT
Start: 2025-08-28

## 2025-08-28 RX ORDER — PREDNISONE 10 MG/1
2 TABLETS TABLET ORAL ONCE A DAY
Qty: 60 | Refills: 0 | OUTPATIENT
Start: 2025-08-28